# Patient Record
Sex: FEMALE | Race: WHITE | Employment: FULL TIME | URBAN - METROPOLITAN AREA
[De-identification: names, ages, dates, MRNs, and addresses within clinical notes are randomized per-mention and may not be internally consistent; named-entity substitution may affect disease eponyms.]

---

## 2018-10-04 ENCOUNTER — HOSPITAL ENCOUNTER (EMERGENCY)
Facility: CLINIC | Age: 28
Discharge: HOME | End: 2018-10-04
Attending: EMERGENCY MEDICINE | Admitting: EMERGENCY MEDICINE

## 2018-10-04 VITALS
DIASTOLIC BLOOD PRESSURE: 84 MMHG | HEIGHT: 67 IN | WEIGHT: 134 LBS | RESPIRATION RATE: 18 BRPM | OXYGEN SATURATION: 98 % | TEMPERATURE: 97.5 F | BODY MASS INDEX: 21.03 KG/M2 | SYSTOLIC BLOOD PRESSURE: 117 MMHG

## 2018-10-04 DIAGNOSIS — T78.1XXA ALLERGIC REACTION TO FOOD, INITIAL ENCOUNTER: ICD-10-CM

## 2018-10-04 PROCEDURE — 99283 EMERGENCY DEPT VISIT LOW MDM: CPT

## 2018-10-04 PROCEDURE — 25000132 ZZH RX MED GY IP 250 OP 250 PS 637: Performed by: EMERGENCY MEDICINE

## 2018-10-04 RX ORDER — EPINEPHRINE 0.3 MG/.3ML
0.3 INJECTION SUBCUTANEOUS
Qty: 0.6 ML | Refills: 0 | Status: SHIPPED | OUTPATIENT
Start: 2018-10-04

## 2018-10-04 RX ORDER — CETIRIZINE HYDROCHLORIDE 10 MG/1
10 TABLET ORAL DAILY
COMMUNITY

## 2018-10-04 RX ORDER — DIPHENHYDRAMINE HCL 25 MG
50 CAPSULE ORAL ONCE
Status: COMPLETED | OUTPATIENT
Start: 2018-10-04 | End: 2018-10-04

## 2018-10-04 RX ADMIN — DIPHENHYDRAMINE HYDROCHLORIDE 50 MG: 25 CAPSULE ORAL at 21:12

## 2018-10-04 ASSESSMENT — ENCOUNTER SYMPTOMS
FEVER: 0
FACIAL SWELLING: 0
VOICE CHANGE: 0
TROUBLE SWALLOWING: 0

## 2018-10-04 NOTE — ED AVS SNAPSHOT
Emergency Department    [address] [facility] [address]    Phone:  [phone]    Fax:  [phone]                                       Jessica Kemp   MRN: [id number]    Department:   Emergency Department   Date of Visit:  10/4/2018           Patient Information     Date Of Birth          1990        Your diagnoses for this visit were:     Allergic reaction to food, initial encounter        You were seen by Trierweiler, Chad A, MD.      Follow-up Information     Follow up with Dora Rojas In 1 week.    Specialty:  Internal Medicine    Contact information:    [facility]  [address] [facility] [address] [phone]          Follow up with  Emergency Department.    Specialty:  EMERGENCY MEDICINE    Why:  If symptoms worsen    Contact information:    [address] [facility] [address] [phone]        Discharge Instructions         Food Allergy  The best way to deal with food allergies is to avoid the foods you are allergic to. Understand and be aware of the foods that you have reacted to. Also be cautious of foods or dishes that may have flavorings or small amounts of foods that you are allergic to.  Symptoms of food allergy may begin within minutes, but can start 2 hours after eating or later. Common symptoms can include:    Nausea    Vomiting    Diarrhea or stomach cramps    Iitchy rash (hives)    Swelling of the eyes, lips, face or tongue    Wheezing    Difficulty breathing or swallowing    Throat tightness    Dizziness or fainting  This kind of allergic reaction, called anaphylaxis, can be life-threatening. In mild and moderate cases the symptoms usually begin improving within 6 to 24 hours. People with certain health problems, such as asthma and eczema, may be more likely to have food allergies. Foods that people are most commonly allergic to are milk or dairy products, eggs, peanuts, tree nuts, soy, shellfish, and  "wheat. Remember that any food can cause a reaction. Treatment for a severe allergic reaction can include epinephrine. If you have a severe food allergy, or have had severe allergic reactions even if you don't know the cause, you should carry this medicine with you for self-injection. It is available by prescription. It is also available in a lower dose form for children from your healthcare provider.  Home care  The following guidelines will help you care for yourself at home:    If your symptoms were moderate to severe, they may fluctuate for the next 24 hours. It may be best to rest at home during that time.    Don't use tobacco or alcohol because they can make symptoms worse. They can also interact with the medicines you are taking to treat the allergic reaction.    If you know what foods caused your reaction today, avoid them in the future. The next and each reaction after this may make your body more sensitive to these foods. This can cause a worse reaction later. Tell your family members, friends, and doctors about your food allergy, especially in an emergency situation since they need to know how to give you epinephrine if you are unable to. This can be life-saving.    Learn how to read food labels so you can check for the substance that you reacted to. If a food does not have a label, it is best to avoid it. When in restaurants, ask about ingredients and tell the staff, \"If I eat a dish containing (food you are allergic to), I could have a severe allergic reaction.\"    If your reaction was severe, get a medical alert bracelet or necklace that notes your allergy.    If epinephrine is prescribed, carry it with you at all times. Learn how to use the device. If you begin to feel the symptoms of another reaction, use the epinephrine to inject yourself right away, and call 911. Don t wait until symptoms become severe.    Oral allergy medicines (diphenhydramine) are antihistamines that can help with the reaction. " You can buy them at any pharmacy or supermarket. They come in liquids, pills, or capsules. Unless your doctor gave you a prescription antihistamine, you can use these medicines to ease itching. Allergy medicines can make you sleepy, so be careful, especially when driving or working. For this reason, you may want to use lower doses during the day and save the higher doses for bedtime. Don't use diphenhydramine if you have glaucoma or if you are a man with trouble urinating because of an enlarged prostate.    If allergy medicines with diphenhydramine make you too sleepy, talk with your healthcare provider. He or she can recommend an over-the counter antihistamine that won't make you sleepy. These may not work as well, though.  Follow-up care  Follow up with your healthcare provider if your symptoms don't get better over the next 2 to 3 days. If you don't know what caused this reaction, your provider may order skin tests and blood tests, or an  elimination diet.  You can find an allergy specialist in your area by contacting:    American Academy of Allergy, Asthma & Immunology, www.aaaai.org    American College of Allergy, Asthma & Immunology, www.acaai.org  When to seek medical advice  Call your [facility] provider right away if any of these occur:    Your symptoms get worse    New or worse swelling in the face, eyelids, lips, mouth, throat, or tongue    Mild trouble swallowing, breathing, or wheezing    Fever of 100.4 F (38.0 C) or higher, or as directed by your health care provider    Severe abdominal pain    Persistent vomiting (unable to keep liquids down) or constant diarrhea    Blood or mucus in the stool  Call 911  If any of these occur, give yourself injectable epinephrine and call 911:    Significant trouble breathing, talking, or swallowing    Any change in level of alertness or unconsciousness, including dizziness, weakness, or fainting    Cool, moist skin    Fast, weak hearbeat    Severe  wheezing    Hives    Severe swelling of the face, tongue, or lips    Drooling    Vomiting that happens soon after eating a food you think you are allergic to    Explosive diarrhea  Date Last Reviewed: 1/11/2016 2000-2017 [employer]. [address] [place] [address], [place] [address]. All rights reserved. This information is not intended as a substitute for professional medical care. Always follow your healthcare professional's instructions.          24 Hour Appointment Hotline       To make an appointment at any [facility], call [phone] ([phone]). If you don't have a family doctor or clinic, we will help you find one. [facility] clinics are conveniently located to serve the needs of you and your family.             Review of your medicines      START taking        Dose / Directions Last dose taken    EPINEPHrine 0.3 MG/0.3ML injection 2-pack   Commonly known as:  EPIPEN/ADRENACLICK/or ANY BX GENERIC EQUIV   Dose:  0.3 mg   Quantity:  0.6 mL        Inject 0.3 mLs (0.3 mg) into the muscle once as needed for anaphylaxis   Refills:  0          Our records show that you are taking the medicines listed below. If these are incorrect, please call your family doctor or clinic.        Dose / Directions Last dose taken    ADDERALL PO        Refills:  0        cetirizine 10 MG tablet   Commonly known as:  zyrTEC   Dose:  10 mg        Take 10 mg by mouth daily   Refills:  0                Prescriptions were sent or printed at these locations (1 Prescription)                   Other Prescriptions                Printed at Department/Unit printer (1 of 1)         EPINEPHrine (EPIPEN/ADRENACLICK/OR ANY BX GENERIC EQUIV) 0.3 MG/0.3ML injection 2-pack                Orders Needing Specimen Collection     None      Pending Results     No orders found from 10/2/2018 to 10/5/2018.            Pending Culture Results     No orders found from 10/2/2018 to 10/5/2018.            Pending Results Instructions     If  you had any lab results that were not finalized at the time of your Discharge, you can call the ED Lab Result RN at [phone]. You will be contacted by this team for any positive Lab results or changes in treatment. The nurses are available 7 days a week from 10A to 6:30P.  You can leave a message 24 hours per day and they will return your call.        Test Results From Your Hospital Stay               Clinical Quality Measure: Blood Pressure Screening     Your blood pressure was checked while you were in the emergency department today. The last reading we obtained was  BP: (!) 143/91 . Please read the guidelines below about what these numbers mean and what you should do about them.  If your systolic blood pressure (the top number) is less than 120 and your diastolic blood pressure (the bottom number) is less than 80, then your blood pressure is normal. There is nothing more that you need to do about it.  If your systolic blood pressure (the top number) is 120-139 or your diastolic blood pressure (the bottom number) is 80-89, your blood pressure may be higher than it should be. You should have your blood pressure rechecked within a year by a primary care provider.  If your systolic blood pressure (the top number) is 140 or greater or your diastolic blood pressure (the bottom number) is 90 or greater, you may have high blood pressure. High blood pressure is treatable, but if left untreated over time it can put you at risk for heart attack, stroke, or kidney failure. You should have your blood pressure rechecked by a primary care provider within the next 4 weeks.  If your provider in the emergency department today gave you specific instructions to follow-up with your doctor or provider even sooner than that, you should follow that instruction and not wait for up to 4 weeks for your follow-up visit.        Thank you for choosing [facility]       Thank you for choosing [facility] for your care. Our goal is always to  "provide you with excellent care. Hearing back from our patients is one way we can continue to improve our services. Please take a few minutes to complete the written survey that you may receive in the mail after you visit with us. Thank you!        Integrated biometrics Information     Integr[employer]ometric[employer] lets you send messages to your doctor, view your test results, renew your prescriptions, schedule appointments and more. To sign up, go to [email or web address] . Click on \"Log in\" on the left side of the screen, which will take you to the Welcome page. Then click on \"Sign up Now\" on the right side of the page.     You will be asked to enter the access code listed below, as well as some personal information. Please follow the directions to create your username and password.     Your access code is: [email or web address]  Expires: 2019 10:18 PM     Your access code will  in 90 days. If you need help or a new code, please call your [facility] clinic or [phone].        Care EveryWhere ID     This is your Care EveryWhere ID. This could be used by other organizations to access your [facility] medical records  [id number]        Equal Access to Services     MALCOM WHITLOCK : Hadii jemal Posadas, wakylie catalan, qaeliezer kaalmapaulo newsome, stiven izquierdo. So [facility] [phone].    ATENCIÓN: Si habla español, tiene a milligan disposición servicios gratuitos de asistencia lingüística. Meg al [phone].    We comply with applicable federal civil rights laws and [place] laws. We do not discriminate on the basis of race, color, national origin, age, disability, sex, sexual orientation, or gender identity.            After Visit Summary       This is your record. Keep this with you and show to your community pharmacist(s) and doctor(s) at your next visit.                  "

## 2018-10-04 NOTE — ED AVS SNAPSHOT
Emergency Department    [address] [place] [address]    Phone:  [phone]    Fax:  [phone]                                       Jessica Kemp   MRN: [id number]    Department:   Emergency Department   Date of Visit:  10/4/2018           After Visit Summary Signature Page     I have received my discharge instructions, and my questions have been answered. I have discussed any challenges I see with this plan with the nurse or doctor.    ..........................................................................................................................................  Patient/Patient Representative Signature      ..........................................................................................................................................  Patient Representative Print Name and Relationship to Patient    ..................................................               ................................................  Date                                   Time    ..........................................................................................................................................  Reviewed by Signature/Title    ...................................................              ..............................................  Date                                               Time          22EPIC Rev 08/18

## 2018-10-05 NOTE — DISCHARGE INSTRUCTIONS
Food Allergy  The best way to deal with food allergies is to avoid the foods you are allergic to. Understand and be aware of the foods that you have reacted to. Also be cautious of foods or dishes that may have flavorings or small amounts of foods that you are allergic to.  Symptoms of food allergy may begin within minutes, but can start 2 hours after eating or later. Common symptoms can include:    Nausea    Vomiting    Diarrhea or stomach cramps    Iitchy rash (hives)    Swelling of the eyes, lips, face or tongue    Wheezing    Difficulty breathing or swallowing    Throat tightness    Dizziness or fainting  This kind of allergic reaction, called anaphylaxis, can be life-threatening. In mild and moderate cases the symptoms usually begin improving within 6 to 24 hours. People with certain health problems, such as asthma and eczema, may be more likely to have food allergies. Foods that people are most commonly allergic to are milk or dairy products, eggs, peanuts, tree nuts, soy, shellfish, and wheat. Remember that any food can cause a reaction. Treatment for a severe allergic reaction can include epinephrine. If you have a severe food allergy, or have had severe allergic reactions even if you don't know the cause, you should carry this medicine with you for self-injection. It is available by prescription. It is also available in a lower dose form for children from your healthcare provider.  Home care  The following guidelines will help you care for yourself at home:    If your symptoms were moderate to severe, they may fluctuate for the next 24 hours. It may be best to rest at home during that time.    Don't use tobacco or alcohol because they can make symptoms worse. They can also interact with the medicines you are taking to treat the allergic reaction.    If you know what foods caused your reaction today, avoid them in the future. The next and each reaction after this may make your body more sensitive to these  "foods. This can cause a worse reaction later. Tell your family members, friends, and doctors about your food allergy, especially in an emergency situation since they need to know how to give you epinephrine if you are unable to. This can be life-saving.    Learn how to read food labels so you can check for the substance that you reacted to. If a food does not have a label, it is best to avoid it. When in restaurants, ask about ingredients and tell the staff, \"If I eat a dish containing (food you are allergic to), I could have a severe allergic reaction.\"    If your reaction was severe, get a medical alert bracelet or necklace that notes your allergy.    If epinephrine is prescribed, carry it with you at all times. Learn how to use the device. If you begin to feel the symptoms of another reaction, use the epinephrine to inject yourself right away, and call 911. Don t wait until symptoms become severe.    Oral allergy medicines (diphenhydramine) are antihistamines that can help with the reaction. You can buy them at any pharmacy or supermarket. They come in liquids, pills, or capsules. Unless your doctor gave you a prescription antihistamine, you can use these medicines to ease itching. Allergy medicines can make you sleepy, so be careful, especially when driving or working. For this reason, you may want to use lower doses during the day and save the higher doses for bedtime. Don't use diphenhydramine if you have glaucoma or if you are a man with trouble urinating because of an enlarged prostate.    If allergy medicines with diphenhydramine make you too sleepy, talk with your healthcare provider. He or she can recommend an over-the counter antihistamine that won't make you sleepy. These may not work as well, though.  Follow-up care  Follow up with your healthcare provider if your symptoms don't get better over the next 2 to 3 days. If you don't know what caused this reaction, your provider may order skin tests and " blood tests, or an  elimination diet.  You can find an allergy specialist in your area by contacting:    American Academy of Allergy, Asthma & Immunology, www.aaaai.org    American College of Allergy, Asthma & Immunology, www.acaai.org  When to seek medical advice  Call your [facility] provider right away if any of these occur:    Your symptoms get worse    New or worse swelling in the face, eyelids, lips, mouth, throat, or tongue    Mild trouble swallowing, breathing, or wheezing    Fever of 100.4 F (38.0 C) or higher, or as directed by your health care provider    Severe abdominal pain    Persistent vomiting (unable to keep liquids down) or constant diarrhea    Blood or mucus in the stool  Call 911  If any of these occur, give yourself injectable epinephrine and call 911:    Significant trouble breathing, talking, or swallowing    Any change in level of alertness or unconsciousness, including dizziness, weakness, or fainting    Cool, moist skin    Fast, weak hearbeat    Severe wheezing    Hives    Severe swelling of the face, tongue, or lips    Drooling    Vomiting that happens soon after eating a food you think you are allergic to    Explosive diarrhea  Date Last Reviewed: 1/11/2016 2000-2017 [employer]. [address] [place] [address], [place] [address]. All rights reserved. This information is not intended as a substitute for professional medical care. Always follow your healthcare professional's instructions.

## 2018-10-05 NOTE — ED PROVIDER NOTES
"  History     Chief Complaint:  Allergic Reaction    HPI   Jessica Kemp is a 27 year old female, with history of ADD, and allergic state, who presents for evaluation of an allergic reaction. At 1930 today, she was eating shrimp, and 20 minutes later became red in the face, puffy, swollen, and felt hot. Her stomach hurt, and she had diarrhea. She had swelling from the top of her face down to the bottom of her chest. Her throat was itchy, but did not feel swollen, and she did not have any trouble breathing. She did not take any medication prior to arrival. The swelling has resolved upon arrival to the ED, and she has not redness to her face or chest. She does endorse nasal congestion which started this morning, and become worse since the reaction. She does not eat shrimp regularly. Of note, she states she has similar reactions when she drinks alcohol, but did not have any alcohol tonight. Her brother has similar allergic reactions to shrimp.     Allergies:  Alcohol  Cats  Codeine  Dogs  Dust Mites  Pollen Extract      Medications:    Adderall  Zyrtec     Past Medical History:    Attention deficit disorder  Allergic state    Past Surgical History:    The patient does not have any pertinent past surgical history.     Family History:    Brother has an allergy to shrimp.    Social History:  Tobacco use: No  Alcohol use: No  The patient presents on her own.       Review of Systems   Constitutional: Negative for fever.   HENT: Negative for facial swelling (resolved), trouble swallowing and voice change.    Skin: Negative for rash (resolved).   Allergic/Immunologic: Positive for environmental allergies and food allergies.   All other systems reviewed and are negative.    Physical Exam     Patient Vitals for the past 24 hrs:   BP Temp Temp src Heart Rate Resp SpO2 Height Weight   10/04/18 2051 (!) 143/91 - - 74 16 100 % - -   10/04/18 2034 (!) 160/94 97.5  F (36.4  C) Temporal 85 16 100 % 1.702 m (5' 7\") 60.8 kg (134 " lb)        Physical Exam  Eye:  Pupils are equal, round, and reactive.  Extraocular movements intact.    ENT:  No rhinorrhea.  Moist mucus membranes.  Normal tongue and tonsil.    Cardiac:  Regular rate and rhythm.  No murmurs, gallops, or rubs.    Pulmonary:  Clear to auscultation bilaterally.  No wheezes, rales, or rhonchi.    Abdomen:  Positive bowel sounds.  Abdomen is soft and non-distended, without focal tenderness.    Musculoskeletal:  Normal movement of all extremities without evidence for deficit.    Skin:  Warm and dry without rashes.    Neurologic:  Non-focal exam without asymmetric weakness or numbness.     Psychiatric:  Normal affect with appropriate interaction with examiner.     Emergency Department Course     Interventions:  2112 Benadryl 50 mg Oral     Emergency Department Course:  Nursing notes and vitals reviewed.  I performed an exam of the patient as documented above.     Findings and plan explained to the patient. Patient discharged home with instructions regarding supportive care, medications, and reasons to return. The importance of close follow-up was reviewed.      I personally answered all related questions prior to discharge.    Impression & Plan      Medical Decision Making:  This delightful 27-year-old without a history of significant allergies presents to us with what appears to be a food reaction after eating shrimp tonight.  The patient describes eating a shrimp dish at a new restaurant when shortly after eating the meal, she developed diffuse redness, itching, and swelling throughout her face and chest.  She described a slight itching sensation to her tongue and throat but no evidence of swelling or closure.  By the time she arrived in the ER, most of her symptoms had already started to resolve.  She has no evidence of rash or significant swelling on my assessment.  Certainly there is no airway or oral involvement.  I gave her a dose of Benadryl and observed her for an hour during  which time she had no return of her symptoms.  At this juncture, I feel she is safe for discharge with reassurance and close outpatient follow-up.  I will prescribe her an EpiPen and advised her to bring this with her to restaurants more any time that she might be eating food where she is not completely sure as to the ingredients.  She was advised to avoid shellfish.  She was advised to follow-up with an allergist.  She was invited back to our facility at any point for worsening of condition or other emergent concerns.    Diagnosis:    ICD-10-CM    1. Allergic reaction to food, initial encounter T78.1XXA         Disposition:   Discharged to home    CMS Diagnoses: None     Discharge Medications:  Discharge Medication List as of 10/4/2018 10:19 PM      START taking these medications    Details   EPINEPHrine (EPIPEN/ADRENACLICK/OR ANY BX GENERIC EQUIV) 0.3 MG/0.3ML injection 2-pack Inject 0.3 mLs (0.3 mg) into the muscle once as needed for anaphylaxis, Disp-0.6 mL, R-0, Local Print             Scribe Disclosure:  I, Jennifer Gomez, am serving as a scribe at 9:05 PM on 10/4/2018 to document services personally performed by Trierweiler, Chad A, MD, based on my observations and the provider's statements to me.     Jennifer Gomez  10/4/2018    EMERGENCY DEPARTMENT       Trierweiler, Chad A, MD  10/05/18 4009

## 2024-02-16 ENCOUNTER — EXTERNAL RECORD (OUTPATIENT)
Dept: OTHER | Age: 34
End: 2024-02-16

## 2024-02-22 ENCOUNTER — TELEPHONE (OUTPATIENT)
Dept: SURGERY | Age: 34
End: 2024-02-22

## 2024-03-04 ENCOUNTER — TELEPHONE (OUTPATIENT)
Dept: SURGERY | Age: 34
End: 2024-03-04

## 2024-03-07 ENCOUNTER — APPOINTMENT (OUTPATIENT)
Dept: SURGERY | Age: 34
End: 2024-03-07

## 2024-03-07 VITALS
RESPIRATION RATE: 16 BRPM | HEART RATE: 84 BPM | WEIGHT: 147.38 LBS | DIASTOLIC BLOOD PRESSURE: 72 MMHG | SYSTOLIC BLOOD PRESSURE: 119 MMHG

## 2024-03-07 DIAGNOSIS — Z15.01 BRCA2 POSITIVE: Primary | ICD-10-CM

## 2024-03-07 DIAGNOSIS — Z15.09 BRCA2 POSITIVE: Primary | ICD-10-CM

## 2024-03-13 ENCOUNTER — APPOINTMENT (OUTPATIENT)
Dept: SURGERY | Age: 34
End: 2024-03-13

## 2024-04-24 ENCOUNTER — APPOINTMENT (OUTPATIENT)
Dept: SURGERY | Age: 34
End: 2024-04-24

## 2024-05-01 ENCOUNTER — APPOINTMENT (OUTPATIENT)
Dept: SURGERY | Age: 34
End: 2024-05-01